# Patient Record
Sex: FEMALE | Race: WHITE | Employment: UNEMPLOYED | URBAN - METROPOLITAN AREA
[De-identification: names, ages, dates, MRNs, and addresses within clinical notes are randomized per-mention and may not be internally consistent; named-entity substitution may affect disease eponyms.]

---

## 2017-01-01 ENCOUNTER — HOSPITAL ENCOUNTER (OUTPATIENT)
Dept: PSYCHIATRY | Age: 37
Discharge: HOME | End: 2017-01-31
Attending: PSYCHIATRY & NEUROLOGY | Admitting: PSYCHIATRY & NEUROLOGY

## 2022-11-29 ENCOUNTER — APPOINTMENT (OUTPATIENT)
Dept: CT IMAGING | Age: 42
End: 2022-11-29

## 2022-11-29 ENCOUNTER — HOSPITAL ENCOUNTER (INPATIENT)
Age: 42
LOS: 2 days | Discharge: HOME | DRG: 149 | End: 2022-12-01
Attending: INTERNAL MEDICINE | Admitting: INTERNAL MEDICINE
Payer: MEDICAID

## 2022-11-29 ENCOUNTER — APPOINTMENT (OUTPATIENT)
Dept: GENERAL RADIOLOGY | Age: 42
End: 2022-11-29

## 2022-11-29 ENCOUNTER — HOSPITAL ENCOUNTER (EMERGENCY)
Age: 42
Discharge: ANOTHER HOSPITAL | End: 2022-11-29
Attending: STUDENT IN AN ORGANIZED HEALTH CARE EDUCATION/TRAINING PROGRAM

## 2022-11-29 VITALS
HEIGHT: 63 IN | RESPIRATION RATE: 18 BRPM | OXYGEN SATURATION: 95 % | TEMPERATURE: 98 F | SYSTOLIC BLOOD PRESSURE: 103 MMHG | HEART RATE: 61 BPM | DIASTOLIC BLOOD PRESSURE: 63 MMHG | WEIGHT: 137 LBS | BODY MASS INDEX: 24.27 KG/M2

## 2022-11-29 DIAGNOSIS — R29.90 STROKE-LIKE SYMPTOM: Primary | ICD-10-CM

## 2022-11-29 DIAGNOSIS — R42 DIZZINESS: ICD-10-CM

## 2022-11-29 DIAGNOSIS — R29.90 STROKE-LIKE SYMPTOMS: Primary | ICD-10-CM

## 2022-11-29 DIAGNOSIS — R53.1 RIGHT SIDED WEAKNESS: ICD-10-CM

## 2022-11-29 PROBLEM — Z72.0 TOBACCO ABUSE: Status: ACTIVE | Noted: 2022-11-29

## 2022-11-29 LAB
A/G RATIO: 2 (ref 1.1–2.2)
ALBUMIN SERPL-MCNC: 5.8 G/DL (ref 3.4–5)
ALP BLD-CCNC: 85 U/L (ref 40–129)
ALT SERPL-CCNC: 18 U/L (ref 10–40)
AMPHETAMINE SCREEN, URINE: POSITIVE
ANION GAP SERPL CALCULATED.3IONS-SCNC: 14 MMOL/L (ref 3–16)
AST SERPL-CCNC: 17 U/L (ref 15–37)
BARBITURATE SCREEN URINE: ABNORMAL
BASOPHILS ABSOLUTE: 0.1 K/UL (ref 0–0.2)
BASOPHILS RELATIVE PERCENT: 0.8 %
BENZODIAZEPINE SCREEN, URINE: ABNORMAL
BILIRUB SERPL-MCNC: 0.3 MG/DL (ref 0–1)
BILIRUBIN URINE: NEGATIVE
BLOOD, URINE: NEGATIVE
BUN BLDV-MCNC: 12 MG/DL (ref 7–20)
CALCIUM SERPL-MCNC: 11.5 MG/DL (ref 8.3–10.6)
CANNABINOID SCREEN URINE: POSITIVE
CHLORIDE BLD-SCNC: 101 MMOL/L (ref 99–110)
CLARITY: CLEAR
CO2: 24 MMOL/L (ref 21–32)
COCAINE METABOLITE SCREEN URINE: ABNORMAL
COLOR: YELLOW
CREAT SERPL-MCNC: 0.7 MG/DL (ref 0.6–1.1)
EKG ATRIAL RATE: 79 BPM
EKG DIAGNOSIS: NORMAL
EKG P AXIS: 55 DEGREES
EKG P-R INTERVAL: 126 MS
EKG Q-T INTERVAL: 380 MS
EKG QRS DURATION: 82 MS
EKG QTC CALCULATION (BAZETT): 435 MS
EKG R AXIS: 64 DEGREES
EKG T AXIS: 51 DEGREES
EKG VENTRICULAR RATE: 79 BPM
EOSINOPHILS ABSOLUTE: 0.2 K/UL (ref 0–0.6)
EOSINOPHILS RELATIVE PERCENT: 2 %
FENTANYL SCREEN, URINE: ABNORMAL
GFR SERPL CREATININE-BSD FRML MDRD: >60 ML/MIN/{1.73_M2}
GLUCOSE BLD-MCNC: 113 MG/DL (ref 70–99)
GLUCOSE BLD-MCNC: 115 MG/DL
GLUCOSE BLD-MCNC: 115 MG/DL (ref 70–99)
GLUCOSE URINE: NEGATIVE MG/DL
HCT VFR BLD CALC: 43.6 % (ref 36–48)
HEMOGLOBIN: 15 G/DL (ref 12–16)
INR BLD: 0.91 (ref 0.87–1.14)
KETONES, URINE: NEGATIVE MG/DL
LEUKOCYTE ESTERASE, URINE: NEGATIVE
LYMPHOCYTES ABSOLUTE: 2.4 K/UL (ref 1–5.1)
LYMPHOCYTES RELATIVE PERCENT: 22.2 %
Lab: ABNORMAL
MCH RBC QN AUTO: 32.5 PG (ref 26–34)
MCHC RBC AUTO-ENTMCNC: 34.4 G/DL (ref 31–36)
MCV RBC AUTO: 94.3 FL (ref 80–100)
METHADONE SCREEN, URINE: ABNORMAL
MICROSCOPIC EXAMINATION: NORMAL
MONOCYTES ABSOLUTE: 0.6 K/UL (ref 0–1.3)
MONOCYTES RELATIVE PERCENT: 5.6 %
NEUTROPHILS ABSOLUTE: 7.5 K/UL (ref 1.7–7.7)
NEUTROPHILS RELATIVE PERCENT: 69.4 %
NITRITE, URINE: NEGATIVE
OPIATE SCREEN URINE: ABNORMAL
OXYCODONE URINE: ABNORMAL
PDW BLD-RTO: 13.4 % (ref 12.4–15.4)
PERFORMED ON: ABNORMAL
PH UA: 5
PH UA: 6.5 (ref 5–8)
PHENCYCLIDINE SCREEN URINE: ABNORMAL
PLATELET # BLD: 479 K/UL (ref 135–450)
PMV BLD AUTO: 9.5 FL (ref 5–10.5)
POTASSIUM REFLEX MAGNESIUM: 4 MMOL/L (ref 3.5–5.1)
PROTEIN UA: NEGATIVE MG/DL
PROTHROMBIN TIME: 12.1 SEC (ref 11.7–14.5)
RBC # BLD: 4.62 M/UL (ref 4–5.2)
SODIUM BLD-SCNC: 139 MMOL/L (ref 136–145)
SPECIFIC GRAVITY UA: <=1.005 (ref 1–1.03)
TOTAL PROTEIN: 8.7 G/DL (ref 6.4–8.2)
TROPONIN: <0.01 NG/ML
URINE REFLEX TO CULTURE: NORMAL
URINE TYPE: NORMAL
UROBILINOGEN, URINE: 0.2 E.U./DL
WBC # BLD: 10.8 K/UL (ref 4–11)

## 2022-11-29 PROCEDURE — 99285 EMERGENCY DEPT VISIT HI MDM: CPT

## 2022-11-29 PROCEDURE — 6370000000 HC RX 637 (ALT 250 FOR IP): Performed by: NURSE PRACTITIONER

## 2022-11-29 PROCEDURE — 1200000000 HC SEMI PRIVATE

## 2022-11-29 PROCEDURE — 2580000003 HC RX 258: Performed by: INTERNAL MEDICINE

## 2022-11-29 PROCEDURE — 85025 COMPLETE CBC W/AUTO DIFF WBC: CPT

## 2022-11-29 PROCEDURE — 80053 COMPREHEN METABOLIC PANEL: CPT

## 2022-11-29 PROCEDURE — 80307 DRUG TEST PRSMV CHEM ANLYZR: CPT

## 2022-11-29 PROCEDURE — 36415 COLL VENOUS BLD VENIPUNCTURE: CPT

## 2022-11-29 PROCEDURE — 85610 PROTHROMBIN TIME: CPT

## 2022-11-29 PROCEDURE — 84484 ASSAY OF TROPONIN QUANT: CPT

## 2022-11-29 PROCEDURE — 93005 ELECTROCARDIOGRAM TRACING: CPT | Performed by: NURSE PRACTITIONER

## 2022-11-29 PROCEDURE — 6360000004 HC RX CONTRAST MEDICATION: Performed by: NURSE PRACTITIONER

## 2022-11-29 PROCEDURE — 81003 URINALYSIS AUTO W/O SCOPE: CPT

## 2022-11-29 PROCEDURE — 70450 CT HEAD/BRAIN W/O DYE: CPT

## 2022-11-29 PROCEDURE — 71045 X-RAY EXAM CHEST 1 VIEW: CPT

## 2022-11-29 PROCEDURE — 70498 CT ANGIOGRAPHY NECK: CPT

## 2022-11-29 PROCEDURE — 93010 ELECTROCARDIOGRAM REPORT: CPT | Performed by: INTERNAL MEDICINE

## 2022-11-29 RX ORDER — MECLIZINE HCL 25MG 25 MG/1
25 TABLET, CHEWABLE ORAL ONCE
Status: COMPLETED | OUTPATIENT
Start: 2022-11-29 | End: 2022-11-29

## 2022-11-29 RX ORDER — ACETAMINOPHEN 325 MG/1
650 TABLET ORAL EVERY 6 HOURS PRN
Status: DISCONTINUED | OUTPATIENT
Start: 2022-11-29 | End: 2022-12-01 | Stop reason: HOSPADM

## 2022-11-29 RX ORDER — SODIUM CHLORIDE 9 MG/ML
INJECTION, SOLUTION INTRAVENOUS PRN
Status: DISCONTINUED | OUTPATIENT
Start: 2022-11-29 | End: 2022-12-01 | Stop reason: HOSPADM

## 2022-11-29 RX ORDER — ONDANSETRON 2 MG/ML
4 INJECTION INTRAMUSCULAR; INTRAVENOUS EVERY 6 HOURS PRN
Status: DISCONTINUED | OUTPATIENT
Start: 2022-11-29 | End: 2022-12-01 | Stop reason: HOSPADM

## 2022-11-29 RX ORDER — ASPIRIN 81 MG/1
81 TABLET, CHEWABLE ORAL ONCE
Status: COMPLETED | OUTPATIENT
Start: 2022-11-29 | End: 2022-11-29

## 2022-11-29 RX ORDER — ASPIRIN 81 MG/1
162 TABLET, CHEWABLE ORAL ONCE
Status: COMPLETED | OUTPATIENT
Start: 2022-11-29 | End: 2022-11-29

## 2022-11-29 RX ORDER — ASPIRIN 81 MG/1
81 TABLET ORAL DAILY
Status: DISCONTINUED | OUTPATIENT
Start: 2022-11-30 | End: 2022-12-01 | Stop reason: HOSPADM

## 2022-11-29 RX ORDER — SODIUM CHLORIDE 0.9 % (FLUSH) 0.9 %
5-40 SYRINGE (ML) INJECTION EVERY 12 HOURS SCHEDULED
Status: DISCONTINUED | OUTPATIENT
Start: 2022-11-29 | End: 2022-12-01 | Stop reason: HOSPADM

## 2022-11-29 RX ORDER — IBUPROFEN 600 MG/1
600 TABLET ORAL ONCE
Status: COMPLETED | OUTPATIENT
Start: 2022-11-29 | End: 2022-11-29

## 2022-11-29 RX ORDER — POLYETHYLENE GLYCOL 3350 17 G/17G
17 POWDER, FOR SOLUTION ORAL DAILY PRN
Status: DISCONTINUED | OUTPATIENT
Start: 2022-11-29 | End: 2022-12-01 | Stop reason: HOSPADM

## 2022-11-29 RX ORDER — ENOXAPARIN SODIUM 100 MG/ML
40 INJECTION SUBCUTANEOUS DAILY
Status: DISCONTINUED | OUTPATIENT
Start: 2022-11-30 | End: 2022-12-01 | Stop reason: HOSPADM

## 2022-11-29 RX ORDER — SODIUM CHLORIDE 0.9 % (FLUSH) 0.9 %
5-40 SYRINGE (ML) INJECTION PRN
Status: DISCONTINUED | OUTPATIENT
Start: 2022-11-29 | End: 2022-12-01 | Stop reason: HOSPADM

## 2022-11-29 RX ORDER — ONDANSETRON 4 MG/1
4 TABLET, ORALLY DISINTEGRATING ORAL EVERY 8 HOURS PRN
Status: DISCONTINUED | OUTPATIENT
Start: 2022-11-29 | End: 2022-12-01 | Stop reason: HOSPADM

## 2022-11-29 RX ORDER — ACETAMINOPHEN 650 MG/1
650 SUPPOSITORY RECTAL EVERY 6 HOURS PRN
Status: DISCONTINUED | OUTPATIENT
Start: 2022-11-29 | End: 2022-12-01 | Stop reason: HOSPADM

## 2022-11-29 RX ADMIN — SODIUM CHLORIDE, PRESERVATIVE FREE 10 ML: 5 INJECTION INTRAVENOUS at 22:28

## 2022-11-29 RX ADMIN — MECLIZINE HYDROCHLORIDE 25 MG: 25 TABLET, CHEWABLE ORAL at 17:14

## 2022-11-29 RX ADMIN — ASPIRIN 81 MG: 81 TABLET, CHEWABLE ORAL at 11:15

## 2022-11-29 RX ADMIN — IBUPROFEN 600 MG: 600 TABLET, FILM COATED ORAL at 17:14

## 2022-11-29 RX ADMIN — IOPAMIDOL 85 ML: 755 INJECTION, SOLUTION INTRAVENOUS at 10:22

## 2022-11-29 RX ADMIN — ASPIRIN 162 MG: 81 TABLET, CHEWABLE ORAL at 12:57

## 2022-11-29 ASSESSMENT — ENCOUNTER SYMPTOMS
VOMITING: 0
ABDOMINAL PAIN: 0
RHINORRHEA: 0
SORE THROAT: 0
BLOOD IN STOOL: 0
NAUSEA: 0
COUGH: 0
BACK PAIN: 0
SHORTNESS OF BREATH: 0
DIARRHEA: 0
EYE PAIN: 0

## 2022-11-29 ASSESSMENT — PAIN DESCRIPTION - DESCRIPTORS
DESCRIPTORS: DULL;DISCOMFORT
DESCRIPTORS: DULL

## 2022-11-29 ASSESSMENT — PAIN DESCRIPTION - FREQUENCY
FREQUENCY: CONTINUOUS
FREQUENCY: CONTINUOUS

## 2022-11-29 ASSESSMENT — PAIN SCALES - GENERAL
PAINLEVEL_OUTOF10: 8
PAINLEVEL_OUTOF10: 8
PAINLEVEL_OUTOF10: 0
PAINLEVEL_OUTOF10: 0

## 2022-11-29 ASSESSMENT — PAIN DESCRIPTION - PAIN TYPE
TYPE: ACUTE PAIN
TYPE: ACUTE PAIN

## 2022-11-29 ASSESSMENT — LIFESTYLE VARIABLES: HOW OFTEN DO YOU HAVE A DRINK CONTAINING ALCOHOL: NEVER

## 2022-11-29 ASSESSMENT — PAIN DESCRIPTION - LOCATION
LOCATION: RIB CAGE
LOCATION: RIB CAGE

## 2022-11-29 ASSESSMENT — PAIN DESCRIPTION - ORIENTATION
ORIENTATION: RIGHT
ORIENTATION: RIGHT

## 2022-11-29 NOTE — ED NOTES
[address] called back at 1738 with:  Bed Number: [address] DaquanJayant  Report Number: [phone]  Accepting Doctor: Mooyd Aquino   Transport: [employer]   ETA: 2030       Tobi [facility]  11/29/22 0767

## 2022-11-29 NOTE — ED NOTES
1013-Code Stroke called  1013-CT called   1013- Stroke team called  1019-Call back received from Dr. Christa Crabtree Stroke team spoke with Alli Frey NP.      Lloyd Sandhu  11/29/22 1021

## 2022-11-29 NOTE — ED PROVIDER NOTES
I independently performed a history and physical on [facility]. All diagnostic, treatment, and disposition decisions were made by myself in conjunction with the advanced practice provider/resident. Labs Reviewed   CBC WITH AUTO DIFFERENTIAL - Abnormal; Notable for the following components:       Result Value    Platelets 664 (*)     All other components within normal limits   COMPREHENSIVE METABOLIC PANEL W/ REFLEX TO MG FOR LOW K - Abnormal; Notable for the following components:    Glucose 113 (*)     Calcium 11.5 (*)     Total Protein 8.7 (*)     Albumin 5.8 (*)     All other components within normal limits   POCT GLUCOSE - Abnormal; Notable for the following components:    POC Glucose 115 (*)     All other components within normal limits   POCT GLUCOSE - Normal   TROPONIN   PROTIME-INR   URINALYSIS WITH REFLEX TO CULTURE     XR CHEST PORTABLE   Final Result   No acute disease      Remote right clavicle fracture is seen, unchanged compared to 2016         CTA HEAD NECK W CONTRAST   Final Result   No apparent arterial high grade stenosis, occlusion or aneurysm within the   head or neck. RECOMMENDATIONS:   Unavailable         CT HEAD WO CONTRAST   Final Result   No acute intracranial abnormality. Trace paranasal sinus disease      Results discussed with DELIA SEPULVEDA by Jennifer Michelle. Nia Hall MD at 10:36 am on   11/29/2022           For further details of [address] [place] [address] emergency department encounter, please see the AMRIT/resident's documentation. I personally saw the patient and performed a substantive portion of the visit including all aspects of the medical decision making. Briefly, this is a 61-year-old female, she is presenting with concerns for dizziness that she describes as a room spinning sensation over the past few days, with right upper and right lower extremity weakness/tingling that started at 7 AM this morning. She did not wake up with her symptoms.   NIH of 3, was put out as a stroke alert given the onset of her right-sided symptoms. Per stroke team, no indication for acute intervention. CT and CTA are negative for any acute concerning findings. She was treated with aspirin, will be transferred for further neurological work-up and treatment. She is comfortable in agreement with plan of care. I personally saw the patient and independently provided 15 minutes of non-concurrent critical care out of the total shared critical care time provided. I, Dr. Jnaet Zuniga, am the primary clinician of record. 1. Stroke-like symptoms    2. Right sided weakness    3. Dizziness      Comment: Please note this report has been produced using speech recognition software and may contain errors related to that system including errors in grammar, punctuation, and spelling, as well as words and phrases that may be inappropriate. If there are any questions or concerns please feel free to contact the dictating provider for clarification.        El Shelby MD  11/29/22 1162

## 2022-11-29 NOTE — ED NOTES
1059-Call placed to [facility] for Transfer to Nasreen Osborne for the Hospitalist placed by Sharif Starr Pap  11/29/22 1100
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
## 2022-11-30 ENCOUNTER — APPOINTMENT (OUTPATIENT)
Dept: MRI IMAGING | Age: 42
DRG: 149 | End: 2022-11-30
Attending: INTERNAL MEDICINE
Payer: MEDICAID

## 2022-11-30 PROBLEM — R42 DIZZINESS: Status: ACTIVE | Noted: 2022-11-30

## 2022-11-30 PROBLEM — H81.393 PERIPHERAL VERTIGO, BILATERAL: Status: ACTIVE | Noted: 2022-11-30

## 2022-11-30 LAB
ANION GAP SERPL CALCULATED.3IONS-SCNC: 9 MMOL/L (ref 3–16)
BUN BLDV-MCNC: 11 MG/DL (ref 7–20)
CALCIUM SERPL-MCNC: 9.9 MG/DL (ref 8.3–10.6)
CHLORIDE BLD-SCNC: 106 MMOL/L (ref 99–110)
CHOLESTEROL, TOTAL: 212 MG/DL (ref 0–199)
CO2: 28 MMOL/L (ref 21–32)
CREAT SERPL-MCNC: 0.6 MG/DL (ref 0.6–1.1)
ESTIMATED AVERAGE GLUCOSE: 111.2 MG/DL
GFR SERPL CREATININE-BSD FRML MDRD: >60 ML/MIN/{1.73_M2}
GLUCOSE BLD-MCNC: 102 MG/DL (ref 70–99)
HBA1C MFR BLD: 5.5 %
HDLC SERPL-MCNC: 58 MG/DL (ref 40–60)
LDL CHOLESTEROL CALCULATED: 136 MG/DL
POTASSIUM REFLEX MAGNESIUM: 4.3 MMOL/L (ref 3.5–5.1)
SODIUM BLD-SCNC: 143 MMOL/L (ref 136–145)
TRIGL SERPL-MCNC: 90 MG/DL (ref 0–150)
TROPONIN: <0.01 NG/ML
TROPONIN: <0.01 NG/ML
VLDLC SERPL CALC-MCNC: 18 MG/DL

## 2022-11-30 PROCEDURE — 84484 ASSAY OF TROPONIN QUANT: CPT

## 2022-11-30 PROCEDURE — 80048 BASIC METABOLIC PNL TOTAL CA: CPT

## 2022-11-30 PROCEDURE — 6360000002 HC RX W HCPCS: Performed by: INTERNAL MEDICINE

## 2022-11-30 PROCEDURE — 80061 LIPID PANEL: CPT

## 2022-11-30 PROCEDURE — 6370000000 HC RX 637 (ALT 250 FOR IP): Performed by: INTERNAL MEDICINE

## 2022-11-30 PROCEDURE — 97116 GAIT TRAINING THERAPY: CPT

## 2022-11-30 PROCEDURE — 36415 COLL VENOUS BLD VENIPUNCTURE: CPT

## 2022-11-30 PROCEDURE — 99253 IP/OBS CNSLTJ NEW/EST LOW 45: CPT | Performed by: PSYCHIATRY & NEUROLOGY

## 2022-11-30 PROCEDURE — 97166 OT EVAL MOD COMPLEX 45 MIN: CPT

## 2022-11-30 PROCEDURE — 2580000003 HC RX 258: Performed by: INTERNAL MEDICINE

## 2022-11-30 PROCEDURE — 99254 IP/OBS CNSLTJ NEW/EST MOD 60: CPT | Performed by: INTERNAL MEDICINE

## 2022-11-30 PROCEDURE — 97162 PT EVAL MOD COMPLEX 30 MIN: CPT

## 2022-11-30 PROCEDURE — 97530 THERAPEUTIC ACTIVITIES: CPT

## 2022-11-30 PROCEDURE — 97535 SELF CARE MNGMENT TRAINING: CPT

## 2022-11-30 PROCEDURE — 97110 THERAPEUTIC EXERCISES: CPT

## 2022-11-30 PROCEDURE — 1200000000 HC SEMI PRIVATE

## 2022-11-30 PROCEDURE — 70551 MRI BRAIN STEM W/O DYE: CPT

## 2022-11-30 PROCEDURE — 83036 HEMOGLOBIN GLYCOSYLATED A1C: CPT

## 2022-11-30 RX ORDER — SODIUM CHLORIDE 9 MG/ML
INJECTION, SOLUTION INTRAVENOUS CONTINUOUS
Status: ACTIVE | OUTPATIENT
Start: 2022-11-30 | End: 2022-11-30

## 2022-11-30 RX ORDER — ATORVASTATIN CALCIUM 40 MG/1
40 TABLET, FILM COATED ORAL NIGHTLY
Status: DISCONTINUED | OUTPATIENT
Start: 2022-11-30 | End: 2022-12-01 | Stop reason: HOSPADM

## 2022-11-30 RX ADMIN — ASPIRIN 81 MG: 81 TABLET, COATED ORAL at 08:53

## 2022-11-30 RX ADMIN — ENOXAPARIN SODIUM 40 MG: 100 INJECTION SUBCUTANEOUS at 08:52

## 2022-11-30 RX ADMIN — ATORVASTATIN CALCIUM 40 MG: 40 TABLET, FILM COATED ORAL at 21:02

## 2022-11-30 RX ADMIN — SODIUM CHLORIDE, PRESERVATIVE FREE 10 ML: 5 INJECTION INTRAVENOUS at 08:54

## 2022-11-30 RX ADMIN — ACETAMINOPHEN 650 MG: 325 TABLET ORAL at 16:14

## 2022-11-30 RX ADMIN — SODIUM CHLORIDE: 9 INJECTION, SOLUTION INTRAVENOUS at 03:55

## 2022-11-30 RX ADMIN — SODIUM CHLORIDE, PRESERVATIVE FREE 10 ML: 5 INJECTION INTRAVENOUS at 21:02

## 2022-11-30 ASSESSMENT — PAIN SCALES - GENERAL
PAINLEVEL_OUTOF10: 8

## 2022-11-30 ASSESSMENT — PAIN DESCRIPTION - FREQUENCY
FREQUENCY: CONTINUOUS

## 2022-11-30 ASSESSMENT — PAIN DESCRIPTION - LOCATION
LOCATION: ABDOMEN
LOCATION: ABDOMEN
LOCATION: RIB CAGE

## 2022-11-30 ASSESSMENT — PAIN DESCRIPTION - DESCRIPTORS
DESCRIPTORS: DULL;DISCOMFORT
DESCRIPTORS: DISCOMFORT;DULL
DESCRIPTORS: DULL;DISCOMFORT

## 2022-11-30 ASSESSMENT — PAIN DESCRIPTION - ORIENTATION
ORIENTATION: RIGHT;UPPER
ORIENTATION: RIGHT;UPPER
ORIENTATION: RIGHT

## 2022-11-30 ASSESSMENT — PAIN - FUNCTIONAL ASSESSMENT
PAIN_FUNCTIONAL_ASSESSMENT: ACTIVITIES ARE NOT PREVENTED
PAIN_FUNCTIONAL_ASSESSMENT: ACTIVITIES ARE NOT PREVENTED

## 2022-11-30 ASSESSMENT — PAIN DESCRIPTION - PAIN TYPE
TYPE: ACUTE PAIN

## 2022-11-30 ASSESSMENT — PAIN DESCRIPTION - ONSET
ONSET: ON-GOING

## 2022-11-30 NOTE — CONSULTS
Consult placed    [phone] [facility] Card[facility]  Date:11/30/2022,  Time:10:13 AM        Electronically signed by Robin Morales on 11/30/2022 at 10:13 AM
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
## 2022-11-30 NOTE — PLAN OF CARE
Problem: Discharge Planning  Goal: Discharge to home or other facility with appropriate resources  11/30/2022 1130 by Robert Zapien RN  Outcome: Progressing  11/30/2022 1127 by Robert Zapien RN  Outcome: Progressing  Flowsheets (Taken 11/30/2022 1310)  Discharge to home or other facility with appropriate resources: Identify barriers to discharge with patient and caregiver  11/30/2022 0226 by Lolis Chase RN  Outcome: Progressing  Flowsheets (Taken 11/30/2022 0226)  Discharge to home or other facility with appropriate resources: Identify barriers to discharge with patient and caregiver     Problem: Pain  Goal: Verbalizes/displays adequate comfort level or baseline comfort level  11/30/2022 1130 by Robert Zapien RN  Outcome: Progressing  11/30/2022 1127 by Robert Zapien RN  Outcome: Progressing  Flowsheets (Taken 11/30/2022 0736)  Verbalizes/displays adequate comfort level or baseline comfort level: Encourage patient to monitor pain and request assistance  11/30/2022 0226 by Lolis Chase RN  Outcome: Progressing  Flowsheets (Taken 11/30/2022 0226)  Verbalizes/displays adequate comfort level or baseline comfort level:   Encourage patient to monitor pain and request assistance   Assess pain using appropriate pain scale   Administer analgesics based on type and severity of pain and evaluate response   Implement non-pharmacological measures as appropriate and evaluate response  Note: Patient satisfied with current pain level     Problem: Neurosensory - Adult  Goal: Achieves stable or improved neurological status  11/30/2022 1130 by Robert Zapien RN  Outcome: Progressing  11/30/2022 1127 by Robert Zapien RN  Outcome: Progressing  Flowsheets (Taken 11/30/2022 5066)  Achieves stable or improved neurological status: Assess for and report changes in neurological status  11/30/2022 0226 by Lolis Chase RN  Outcome: Progressing  Flowsheets (Taken 11/30/2022 0226)  Achieves stable or improved neurological status: Assess for and report changes in neurological status  Goal: Achieves maximal functionality and self care  11/30/2022 1130 by Enoch Rothman RN  Outcome: Progressing  11/30/2022 1127 by Enoch Rothman RN  Outcome: Progressing  Flowsheets (Taken 11/30/2022 1756)  Achieves maximal functionality and self care: Monitor swallowing and airway patency with patient fatigue and changes in neurological status  11/30/2022 0226 by Suleman Wilson RN  Outcome: Progressing     Problem: Skin/Tissue Integrity - Adult  Goal: Skin integrity remains intact  11/30/2022 1130 by Enoch Rothman RN  Outcome: Progressing  11/30/2022 1127 by Enoch Rothman RN  Outcome: Progressing  Flowsheets  Taken 11/30/2022 1127  Skin Integrity Remains Intact: Monitor for areas of redness and/or skin breakdown  Taken 11/30/2022 0842  Skin Integrity Remains Intact: Monitor for areas of redness and/or skin breakdown  11/30/2022 0226 by Suleman Wilson RN  Outcome: Progressing  Flowsheets (Taken 11/30/2022 0226)  Skin Integrity Remains Intact: Monitor for areas of redness and/or skin breakdown     Problem: Musculoskeletal - Adult  Goal: Return mobility to safest level of function  11/30/2022 1130 by Enoch Rothman RN  Outcome: Progressing  11/30/2022 1127 by Enoch Rothman RN  Outcome: Progressing  Flowsheets (Taken 11/30/2022 2582)  Return Mobility to Safest Level of Function: Assess patient stability and activity tolerance for standing, transferring and ambulating with or without assistive devices  11/30/2022 0226 by Suleman Wilson RN  Outcome: Progressing  Flowsheets (Taken 11/30/2022 0226)  Return Mobility to Safest Level of Function: Assess patient stability and activity tolerance for standing, transferring and ambulating with or without assistive devices  Note: Fall precautions in place  Goal: Maintain proper alignment of affected body part  11/30/2022 1130 by Enoch Rothman RN  Outcome: Progressing  11/30/2022 1127 by Enoch Rothman RN  Outcome: Progressing  Flowsheets (Taken 11/30/2022 6452)  Maintain proper alignment of affected body part: Support and protect limb and body alignment per provider's orders  11/30/2022 0226 by Georgie Mejía RN  Outcome: Progressing  Goal: Return ADL status to a safe level of function  11/30/2022 1130 by Adela Gilliam RN  Outcome: Progressing  11/30/2022 1127 by Adela Gilliam RN  Outcome: Progressing  Flowsheets (Taken 11/30/2022 7146)  Return ADL Status to a Safe Level of Function: Administer medication as ordered  11/30/2022 0226 by Georgie Mejía RN  Outcome: Progressing     Problem: Gastrointestinal - Adult  Goal: Minimal or absence of nausea and vomiting  11/30/2022 1130 by Adela Gilliam RN  Outcome: Progressing  11/30/2022 1127 by Adela Gilliam RN  Outcome: Progressing  Flowsheets (Taken 11/30/2022 6694)  Minimal or absence of nausea and vomiting: Administer IV fluids as ordered to ensure adequate hydration  11/30/2022 0226 by Georgie Mejía RN  Outcome: Not Progressing  Note: Pt reported nausea and reduced appetite  Goal: Maintains or returns to baseline bowel function  11/30/2022 1130 by Adela Gilliam RN  Outcome: Progressing  11/30/2022 1127 by Adela Gilliam RN  Outcome: Progressing  Flowsheets (Taken 11/30/2022 5044)  Maintains or returns to baseline bowel function: Assess bowel function  11/30/2022 0226 by Georgie Mejía RN  Outcome: Progressing  Goal: Maintains adequate nutritional intake  11/30/2022 1130 by Adela Gilliam RN  Outcome: Progressing  11/30/2022 1127 by Adela Gilliam RN  Outcome: Progressing  Flowsheets (Taken 11/30/2022 6090)  Maintains adequate nutritional intake: Monitor percentage of each meal consumed  11/30/2022 0226 by Georgie Mejía RN  Outcome: Progressing     Problem: Gastrointestinal - Adult  Goal: Minimal or absence of nausea and vomiting  11/30/2022 1130 by Adela Gilliam RN  Outcome: Progressing  11/30/2022 1127 by Adela Gilliam RN  Outcome: Progressing  Flowsheets (Taken 11/30/2022 8311)  Minimal or absence of nausea and vomiting: Administer IV fluids as ordered to ensure adequate hydration  11/30/2022 0226 by Lolis Chase, RN  Outcome: Not Progressing  Note: Pt reported nausea and reduced appetite
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
## 2022-11-30 NOTE — PROGRESS NOTES
Occupational Therapy  Facility/Department: [place] [address] - MED SURG  Occupational Therapy Initial Assessment and Treatment Note    Name: Yair Billings  : 1980  MRN: [id number]  Date of Service: 2022    Discharge Recommendations:  24 hour supervision or assist      Patient Diagnosis(es): There were no encounter diagnoses. Past Medical History:  has a past medical history of Depression and Psychiatric problem. Past Surgical History:  has a past surgical history that includes Tonsillectomy and chest tube insertion (Right). Assessment   Performance deficits / Impairments: Decreased functional mobility ; Decreased ADL status; Decreased strength;Decreased balance  Assessment: OT eval and tx completed. Pt admitted for stroke-like symptoms with c/o RUE/RLE heaviness. Pt is independent at baseline. During OT session, pt was SBA/CGA for standing balance and transfers. Appeared to be unstable at R knee but did not lose balance. Balance improved with RW to SBA. Pt instructed on RUE exercises for strengthening. OT will cont to address RUE function, mobility and ADLs. Cont OT in acute care. Prognosis: Good  Decision Making: Medium Complexity  REQUIRES OT FOLLOW-UP: Yes  Activity Tolerance  Activity Tolerance: Patient Tolerated treatment well        Plan   Occupational Therapy Plan  Times Per Week: 3-5x  Current Treatment Recommendations: Functional mobility training, Strengthening, Self-Care / ADL, Equipment evaluation, education, & procurement, Safety education & training     Restrictions  Restrictions/Precautions  Restrictions/Precautions: Fall Risk, Up as Tolerated  Position Activity Restriction  Other position/activity restrictions: IV    Subjective   General  Chart Reviewed: Yes  Patient assessed for rehabilitation services?: Yes  Family / Caregiver Present: No  Referring Practitioner: ANGEL Claros  Diagnosis: stroke like symptoms  Subjective  Subjective: Pt agreeable to OT.  Denies pain but c/o's R side heaviness. General Comment  Comments: RN approved therapy   Social/Functional History  Social/Functional History  Lives With: Significant other  Type of Home: House  Home Layout: One level  Home Access: Stairs to enter without rails  Entrance Stairs - Number of Steps: 2 NEYMAR  Bathroom Shower/Tub: Tub/Shower unit  Bathroom Toilet: Standard  Home Equipment:  (No DME)  Has the patient had two or more falls in the past year or any fall with injury in the past year?: No  ADL Assistance: [address]: Independent  Homemaking Responsibilities: Yes  Ambulation Assistance: Independent  Transfer Assistance: Independent  Active : No  Patient's  Info: Friends and family  Occupation: Unemployed  [address]: Clean, dance, go for walks. Objective   Heart Rate: 78  Heart Rate Source: Monitor  BP: (!) 151/84  BP Location: Left Arm  BP Method: Automatic  Patient Position: Semi fowlers  MAP (Calculated): 106  Resp: 16  SpO2: 95 %  O2 Device: None (Room air)             Safety Devices  Type of Devices: Call light within reach; Chair alarm in place;Gait belt;Left in chair;Nurse notified     Toilet Transfers  Toilet - Technique: Ambulating  Equipment Used: Grab bars  Toilet Transfer: Stand by assistance  AROM: Within functional limits (BUE is [employer])  Strength: Generally decreased, functional (LUE is WNL. R shoulder to elbow 4/5, R forearm 4+/5)  Coordination: Within functional limits (Pt able to use RUE for ADL tasks without difficulty.  Able to grasp and release and handle items.)  Tone: Normal  Sensation: Intact  ADL  Feeding: Independent  Grooming: Stand by assistance  Grooming Skilled Clinical Factors: Washed hands at sink  LE Dressing: Stand by assistance  LE Dressing Skilled Clinical Factors: donned socks while seated EOB  Toileting: Stand by assistance        Bed mobility  Supine to Sit: Stand by assistance (HOB elevated)  Transfers  Stand Pivot Transfers: Contact guard assistance;Stand by assistance (CGA without device, noted RLE instability at knee when she took steps. When provided with RW, she was SBA to walk in hallway)  Sit to stand: Stand by assistance  Stand to sit: Stand by assistance  Transfer Comments: SBA/CGA to ambulate to/from BR without device. Vision  Vision: Impaired  Vision Exceptions: Wears glasses at all times  Hearing  Hearing: Within functional limits  Cognition  Overall Cognitive Status: WFL  Orientation  Overall Orientation Status: Within Normal Limits  Orientation Level: Oriented X4           A/AROM Exercises: Education provided on RUE exercises for shoulder, elbow, forearm, wrist and hand --additional education would be beneficial.  Education Given To: Patient  Education Provided: Role of Therapy;Home Exercise Program;Plan of Care;ADL Adaptive Strategies; Equipment;Transfer Training;IADL Safety  Education Method: Verbal  Education Outcome: Verbalized understanding;Continued education needed   Disease Specific Education: Pt educated on signs and symptoms of CVA and need to seek immediate medical attention should they occur.  Pt verbalized understanding    AM-PAC Score      AM-[facility] Inpatient Daily Activity Raw Score: 19 (11/30/22 1420)  AM-PAC Inpatient ADL T-Scale Score : 40.22 (11/30/22 1420)  ADL Inpatient CMS 0-100% Score: 42.8 (11/30/22 1420)  ADL Inpatient CMS G-Code Modifier : CK (11/30/22 1420)  Short Term Goals  Time Frame for Short Term Goals: 1 week (12/7) unless noted  Short Term Goal 1: Perform functional transfers with supervision and AD as needed  Short Term Goal 2: Perform RUE exer program independently after instruction by 12/02  Short Term Goal 3: Perform 2-3 UE ADL tasks with supervision  Patient Goals   Patient goals : Be able to walk     Therapy Time   Individual Concurrent Group Co-treatment   Time In 1025         Time Out 1058         Minutes 33         Timed Code Treatment Minutes: 23 Minutes (10 min eval)   If pt is discharged prior to next OT session,

## 2022-11-30 NOTE — CARE COORDINATION
CASE MANAGEMENT INITIAL ASSESSMENT      Reviewed chart and completed assessment with patient:bedside  Family present: yes  Explained Case Management role/services. Primary contact information:Mary/family member    Health Care Decision Maker :   Primary Decision Maker: [facility] - Emergency Contact - [phone]    Secondary Decision Maker: Kandi Crowley - Other - [phone]          Can this person be reached and be able to respond quickly, such as within a few minutes or hours? Yes    Admit date/status:11/29/22 INPT  Diagnosis:stroke like symptoms   Is this a Readmission?:  No      Insurance:NONE   Precert required for SNF: No       3 night stay required: No    Living arrangements, Adls, care needs, prior to admission: pt lives in a 1 story house with SO/Bill. 2 NEYMAR. IPTA    Durable Medical Equipment at home:  none    Services in the home and/or outpatient, prior to admission:none    Current PCP:NONE                                Medications: Prescription coverage? No Will pt require financial assistance with medications Yes     Transportation needs: none/private. Family/friends provide transportation       PT/OT recs:none    Hospital Exemption Notification (HEN):needed for SNF    Barriers to discharge:no PCP, no Payor    Plan/comments: Stroke-like symptoms. Management per neuro and IM. Plan for MRI and ECHO. Pt from home w/SO. IPTA. PCP list and [facility] information given. Consult placed to financial counselor for assistance with Medicaid application. Pt denies other needs at this time. CM will follow.  Vy Ambrose RN     ECOC on chart for MD signature

## 2022-11-30 NOTE — PLAN OF CARE
TODAYS DATE:  11/30/2022    Discussed personal risk factors for Stroke /TIA with patient/family, and ways to reduce the risk for a recurrent stroke. Patient's personal risk factors which were identified are:     [] Alcohol Abuse: check with your physician before any alcohol consumption. [] Atrial fibrillation: may cause blood clots. [] Drug Abuse: Seek help, talk with your doctor  [] Clotting Disorder  [] Diabetes  [] Family history of stroke or heart disease  [] High Blood Pressure/Hypertension: work with your physician.  [] High cholesterol: monitor cholesterol levels with your physician.   [] Overweight/Obesity: work with your physician for your ideal body weight.  [] Physical Inactivity: get regular exercise as directed by your physician. [] Personal history of previous TIA or stroke  [] Poor Diet; decrease salt (sodium) in your diet, follow diet directed by physician. [x] Smoking: Cigarette/Cigar: stop smoking. Reviewed the Following Education with Patient and/or Family:   -Signs and Symptoms of Stroke:     (facial droop, weakness/numbness especially on one side, speech difficulty, sudden confusion, sudden loss of vision, sudden severe headache,       sudden loss of balance or having difficulty walking, syncope or seizure)  -How to activate EMS (911)   -Importance of Follow Up Appointment at Discharge   -Importance of Compliance with Medications Prescribed at Discharge     Pt verbalized understanding.      Family Present during Education: no     Stroke Education Booklet given to patient/family which includes above education: yes     Electronically signed by Carlito De La Cruz RN on 11/30/2022 at 6:40 AM

## 2022-11-30 NOTE — PROGRESS NOTES
Hospitalist Progress Note      PCP: Ines Valentino APRN - CNP    Date of Admission: 11/29/2022    Chief Complaint: Chest pain, right-sided weakness    Hospital Course: Transferred to [facility] for neurology evaluation after presenting to ER with right-sided weakness. Also complains of some left anterior chest pain. Smoker. Subjective: Currently does not have chest pain or shortness of breath. Right-sided weakness has improved      Medications:  Reviewed    Infusion Medications    sodium chloride       Scheduled Medications    atorvastatin  40 mg Oral Nightly    sodium chloride flush  5-40 mL IntraVENous 2 times per day    enoxaparin  40 mg SubCUTAneous Daily    aspirin  81 mg Oral Daily     PRN Meds: sodium chloride flush, sodium chloride, ondansetron **OR** ondansetron, polyethylene glycol, acetaminophen **OR** acetaminophen      Intake/Output Summary (Last 24 hours) at 11/30/2022 1247  Last data filed at 11/30/2022 1154  Gross per 24 hour   Intake 350 ml   Output 500 ml   Net -150 ml       Physical Exam Performed:    BP (!) 151/84   Pulse 78   Temp 97.4 °F (36.3 °C) (Oral)   Resp 16   Ht 5' 3\" (1.6 m)   Wt 139 lb 14.4 oz (63.5 kg)   SpO2 95%   BMI 24.78 kg/m²     General appearance: No apparent distress, appears stated age and cooperative. HEENT: Pupils equal, round, and reactive to light. Conjunctivae/corneas clear. Neck: Supple, with full range of motion. No jugular venous distention. Trachea midline. Respiratory:  Normal respiratory effort. Clear to auscultation, bilaterally without Rales/Wheezes/Rhonchi. Cardiovascular: Regular rate and rhythm with normal S1/S2 without murmurs, rubs or gallops. Abdomen: Soft, non-tender, non-distended with normal bowel sounds. Musculoskeletal: No clubbing, cyanosis or edema bilaterally. Full range of motion without deformity. Skin: Skin color, texture, turgor normal.  No rashes or lesions.   Neurologic:  Neurovascularly intact without any focal sensory/motor deficits. Cranial nerves: II-XII intact, grossly non-focal.  Psychiatric: Alert and oriented, thought content appropriate, normal insight  Capillary Refill: Brisk, 3 seconds, normal   Peripheral Pulses: +2 palpable, equal bilaterally       Labs:   Recent Labs     11/29/22  1004   WBC 10.8   HGB 15.0   HCT 43.6   *     Recent Labs     11/29/22  1004 11/30/22  0734    143   K 4.0 4.3    106   CO2 24 28   BUN 12 11   CREATININE 0.7 0.6   CALCIUM 11.5* 9.9     Recent Labs     11/29/22  1004   AST 17   ALT 18   BILITOT 0.3   ALKPHOS 85     Recent Labs     11/29/22  1004   INR 0.91     Recent Labs     11/29/22  1004 11/30/22  1030   TROPONINI <0.01 <0.01       Urinalysis:      Lab Results   Component Value Date/Time    NITRU Negative 11/29/2022 12:28 PM    BLOODU Negative 11/29/2022 12:28 PM    SPECGRAV <=1.005 11/29/2022 12:28 PM    GLUCOSEU Negative 11/29/2022 12:28 PM       Radiology:  MRI BRAIN WO CONTRAST    (Results Pending)       IP CONSULT TO NEUROLOGY  IP CONSULT TO FINANCIAL COUNSELOR  IP CONSULT TO CARDIOLOGY    Assessment/Plan:    Active Hospital Problems    Diagnosis     Stroke-like symptom [R29.90]      Priority: Medium    Tobacco abuse [Z72.0]      Priority: Medium     PLAN:    Right-sided upper and lower extremity weakness  This has since resolved. Patient was transferred to our hospital for neurology evaluation. MRI of the brain ordered and is currently pending. Echocardiogram not yet done. Patient is a smoker. Given his MRI of the brain does not show the CVA, I believe patient will require secondary prevention for TIA/CVA. Chest pain  Right-sided anterior chest heaviness radiating to left side of the anterior chest.  Concerning for coronary artery disease mainly because of personal risk factors. Cardiology consulted. Echocardiogram ordered. Cardiac stress test may not be needed pending echo results.     Tobacco abuse  Risk factor both for TIA/CVA and acute coronary syndrome. Patient states she decided to quit and will not smoke again. Discussed with patient and questions answered. DVT Prophylaxis: Lovenox  Diet: ADULT DIET; Regular  Code Status: Full Code  PT/OT Eval Status: Ordered    Dispo -unclear day of discharge pending test results.     Appropriate for A1 Discharge Unit: Marguerite Boo MD

## 2022-11-30 NOTE — PROGRESS NOTES
Patient admitted to room 351 from [facility] ED. Patient oriented to room, call light, bed rails, phone, lights and bathroom. Patient instructed about the schedule of the day including: vital sign frequency, lab draws, possible tests, frequency of MD and staff rounds, including RN/MD rounding together at bedside, daily weights, and I &O's. Patient instructed about prescribed diet, how to use 8MENU, and television. Bed alarm in place, patient aware of placement and reason. Telemetry box in place, patient aware of placement and reason. Bed locked, in lowest position, side rails up 2/4, call light within reach. Patient complaining of nausea, dizziness, and pain in the RUQ. Unsteadiness of right leg when ambulating to the bathroom.

## 2022-11-30 NOTE — CONSULTS
CARDIOLOGY CONSULTATION        Patient Name: Vincent Hebert  Date of admission: 11/29/2022  9:00 PM  Admission Dx: Stroke-like symptom [R29.90]  Requesting Physician: Karen Garza MD  Primary Care physician: DIONISIO Rankin - CNP    Reason for Consultation/Chief Complaint: Chest pain     History of Present Illness:     Vincent Hebert is a 38 y.o. patient with a prior medical history notable for depression, prior substance abuse, tobacco abuse, who presented to the hospital with complaints of strokelike symptoms. Cardiology is consulted for chest pain and elevated troponin    ED course/Vital signs on presentation: Blood pressure initially elevated 181/107, afebrile, heart rate 97, 100 percent sats on room air. EKG showed sinus rhythm, possible left atrial enlargement and nonspecific ST abnormality. Echocardiogram pending. Troponins negative. UDS positive for amphetamines and cannabis. NCHCT neg. CT Head and neck w/ shows no intracranial stenosis or aneurysm. Patient present initially with dizziness, nausea and right sided upper extremity and lower extremity heaviness/weakness. No dysarthria or facial droop. No paresthesias. Mild visual disturbance. Notes she's been dizzy since Friday. Today patient states she's had onset right upper chest pain since Sunday. She notes she was lying on couch just prior to onset. Heaviness quality. She notes a little hard to breathe since onset. She notes she has never had chest pain like this before. No radiation of pain. Pain has persistent through night/day without resolution or improvement since onset. Non-positional. Mildly pleuritic. Non-exertional. No recent viral illness. She describes dizziness as vertiginous. Vision is clear today. No slurred speech. She states her R side UE/LE feel heavy and \"funny\" to her. Denies palpitations, dizziness, near-syncope or mason syncope.  Denies paroxysmal nocturnal dyspnea, orthopnea, increasing lower extremity edema or weight gain. Quit smoking 3 weeks ago. 1PPD at that time, 2PPD at peak. 30 year smoking history. She states last methamphetamine use was over 1 year ago. UDS positive. She reiterates no recent use. Past Medical History:   has a past medical history of Depression and Psychiatric problem. Surgical History:   has a past surgical history that includes Tonsillectomy and chest tube insertion (Right). Social History:   reports that she has been smoking cigarettes. She has been smoking an average of 1 pack per day. She has never used smokeless tobacco. She reports current drug use. Drug: Marijuana Coleen Pleas). She reports that she does not drink alcohol. Family History:  family history includes No Known Problems in her mother; Substance Abuse in her father. Reports no history of early onset coronary artery disease, myocardial infarction, cerebrovascular accident or sudden cardiac death among primary relatives. Mother  in [address] along with brother early in life. 2 other brothers - no heart history. Father  of cancer. [employer] boy, autism, no cardiac issues. Home Medications:  Were reviewed and are listed in nursing record and/or below  Prior to Admission medications    Medication Sig Start Date End Date Taking? Authorizing Provider   medical marijuana Take 1 each by mouth as needed.     Historical Provider, MD        CURRENT Medications:  atorvastatin (LIPITOR) tablet 40 mg, Nightly  sodium chloride flush 0.9 % injection 5-40 mL, 2 times per day  sodium chloride flush 0.9 % injection 5-40 mL, PRN  0.9 % sodium chloride infusion, PRN  enoxaparin (LOVENOX) injection 40 mg, Daily  ondansetron (ZOFRAN-ODT) disintegrating tablet 4 mg, Q8H PRN   Or  ondansetron (ZOFRAN) injection 4 mg, Q6H PRN  polyethylene glycol (GLYCOLAX) packet 17 g, Daily PRN  acetaminophen (TYLENOL) tablet 650 mg, Q6H PRN   Or  acetaminophen (TYLENOL) suppository 650 mg, Q6H PRN  aspirin EC tablet 81 mg, Daily      Allergies:  Patient has no known allergies. Review of Systems:   A 14 point review of symptoms completed. Pertinent positives identified in the HPI, all other review of symptoms negative as below. Objective:     Vitals:    11/30/22 0318 11/30/22 0736 11/30/22 1030 11/30/22 1113   BP: (!) 134/92 125/79 (!) 144/90 (!) 151/84   Pulse: 57 63 97 78   Resp: 16 16  16   Temp: 97.5 °F (36.4 °C) 98.1 °F (36.7 °C)  97.4 °F (36.3 °C)   TempSrc: Oral Oral  Oral   SpO2: 98% 99% 96% 95%   Weight:       Height:          Weight: 139 lb 14.4 oz (63.5 kg)       PHYSICAL EXAM:    General:  Young woman in no acute distress    Head:  Normocephalic, atraumatic   Eyes:  Conjunctiva/corneas clear, anicteric sclerae    Nose: Nares normal, no drainage or sinus tenderness   Throat: No abnormalities of the lips, oral mucosa or tongue. Neck: Trachea midline. Neck supple with no lymphadenopathy, thyroid not enlarged, symmetric, no tenderness/mass/nodules   Lungs:   Clear to auscultation bilaterally, no wheezes, no rales, no respiratory distress   Chest Wall:  Chest pain is reproducible on palp chest wall    Heart:  Regular rate and rhythm, normal S1, normal S2, no murmur, no rub, no S3/S4, PMI non-displaced. Abdomen:   Soft, non-tender, with normoactive bowel sounds. No masses, no hepatosplenomegaly   Extremities: No cyanosis, clubbing or pitting edema. Vascular: 2+ radial,  posterior tibial pulses bilaterally. Brisk carotid upstrokes without carotid bruit. Skin: Skin color, texture, turgor are normal with no rashes or ulceration. Pysch: Euthymic mood, inappropriate affect   Neurologic: Oriented to person, place and time. No slurred speech or facial asymmetry.  RUE weakness is noted on strength exam.         Labs:   CBC:   Lab Results   Component Value Date/Time    WBC 10.8 11/29/2022 10:04 AM    RBC 4.62 11/29/2022 10:04 AM    HGB 15.0 11/29/2022 10:04 AM    HCT 43.6 11/29/2022 10:04 AM    MCV 94.3 2022 10:04 AM    RDW 13.4 2022 10:04 AM     2022 10:04 AM     CMP:  Lab Results   Component Value Date/Time     2022 07:34 AM    K 4.3 2022 07:34 AM     2022 07:34 AM    CO2 28 2022 07:34 AM    BUN 11 2022 07:34 AM    CREATININE 0.6 2022 07:34 AM    GFRAA >60 2016 05:54 PM    AGRATIO 2.0 2022 10:04 AM    LABGLOM >60 2022 07:34 AM    GLUCOSE 102 2022 07:34 AM    PROT 8.7 2022 10:04 AM    CALCIUM 9.9 2022 07:34 AM    BILITOT 0.3 2022 10:04 AM    ALKPHOS 85 2022 10:04 AM    AST 17 2022 10:04 AM    ALT 18 2022 10:04 AM     PT/INR:  No results found for: PTINR  HgBA1c:No results found for: LABA1C  Lab Results   Component Value Date    TROPONINI <0.01 2022       No results found for: CHOL  No results found for: TRIG  No results found for: HDL  No results found for: LDLCHOLESTEROL, LDLCALC  No results found for: LABVLDL, VLDL  No results found for: CHOLHDLRATIO     Cardiac Data:     EKG: Personally reviewed with my interpretation as above    Telemetry personally reviewed: no events, NSR 6-70's. Echo: Pending      Impression and Plan:      Chest pain, non-cardiac  -EKG shows mild abnormality, troponins neg   -Will obtain transthoracic echocardiogram for evaluation of underlying cardiac structure and function. Stroke-like symptoms, abnormal exam   Dizziness/vertigo  Weakness RUE  -CT neg thus far; MRI planned  -echo as above     Substance abuse   -Patient was counseled on deleterious effects of illicit drug use and counseled to quit. She denies ongoing use. Tobacco use  -I Strongly advised complete smoking cessation. Resources given to assist quitting including the followin-800-QUIT NOW. Depression       Will follow up echo results. No stress test needed - chest pain is non-cardiac.      Patient Active Problem List   Diagnosis    Stroke-like symptom    Tobacco abuse I will address the patient's cardiac risk factors and adjusted pharmacologic treatment as needed. In addition, I have reinforced the need for patient directed risk factor modification. All questions and concerns were addressed to the patient/family. Alternatives to my treatment were discussed. Thank you for allowing us to participate in the care of Yara Rubio. Please call me with any questions [phone].     Bennie Mcarthur MD, [facility]  Cardiovascular Disease  [facility]  [phone] [facility]  [phone] [address] [place] [address]  11/30/2022 10:41 AM

## 2022-11-30 NOTE — ED NOTES
Report called to [facility] at [facility]. Pt transported on cardiac monitor in stable condition.       Anne Vargas RN  11/29/22 2029

## 2022-11-30 NOTE — PROGRESS NOTES
4 Eyes Admission Assessment     I agree as the admission nurse that 2 RN's have performed a thorough Head to Toe Skin Assessment on the patient. ALL assessment sites listed below have been assessed on admission. Areas assessed by both nurses:   [x]   Head, Face, and Ears   [x]   Shoulders, Back, and Chest  [x]   Arms, Elbows, and Hands   [x]   Coccyx, Sacrum, and Ischium  [x]   Legs, Feet, and Heels        Does the Patient have Skin Breakdown?   No         Kiko Prevention initiated:  NA   Wound Care Orders initiated:  NA      WOC nurse consulted for Pressure Injury (Stage 3,4, Unstageable, DTI, NWPT, and Complex wounds) or Kiko score 18 or lower:  NA      Nurse 1 eSignature: Electronically signed by Patti Arndt RN on 11/29/22 at 11:51 PM EST    **SHARE this note so that the co-signing nurse is able to place an eSignature**    Nurse 2 eSignature: Electronically signed by Marilia Begum RN on 11/30/22 at 12:39 AM EST

## 2022-11-30 NOTE — PLAN OF CARE
Problem: Gastrointestinal - Adult  Goal: Minimal or absence of nausea and vomiting  Outcome: Not Progressing  Note: Pt reported nausea and reduced appetite     Problem: Discharge Planning  Goal: Discharge to home or other facility with appropriate resources  Outcome: Progressing  Flowsheets (Taken 11/30/2022 0226)  Discharge to home or other facility with appropriate resources: Identify barriers to discharge with patient and caregiver     Problem: Pain  Goal: Verbalizes/displays adequate comfort level or baseline comfort level  Outcome: Progressing  Flowsheets (Taken 11/30/2022 0226)  Verbalizes/displays adequate comfort level or baseline comfort level:   Encourage patient to monitor pain and request assistance   Assess pain using appropriate pain scale   Administer analgesics based on type and severity of pain and evaluate response   Implement non-pharmacological measures as appropriate and evaluate response  Note: Patient satisfied with current pain level     Problem: Neurosensory - Adult  Goal: Achieves stable or improved neurological status  Outcome: Progressing  Flowsheets (Taken 11/30/2022 0226)  Achieves stable or improved neurological status: Assess for and report changes in neurological status  Goal: Achieves maximal functionality and self care  Outcome: Progressing     Problem: Skin/Tissue Integrity - Adult  Goal: Skin integrity remains intact  Outcome: Progressing  Flowsheets (Taken 11/30/2022 0226)  Skin Integrity Remains Intact: Monitor for areas of redness and/or skin breakdown     Problem: Musculoskeletal - Adult  Goal: Return mobility to safest level of function  Outcome: Progressing  Flowsheets (Taken 11/30/2022 0226)  Return Mobility to Safest Level of Function: Assess patient stability and activity tolerance for standing, transferring and ambulating with or without assistive devices  Note: Fall precautions in place  Goal: Maintain proper alignment of affected body part  Outcome: Progressing  Goal: Return ADL status to a safe level of function  Outcome: Progressing     Problem: Gastrointestinal - Adult  Goal: Maintains or returns to baseline bowel function  Outcome: Progressing  Goal: Maintains adequate nutritional intake  Outcome: Progressing

## 2022-11-30 NOTE — PROGRESS NOTES
Physical Therapy  Facility/Department: [place] [id number] - MED SURG  Physical Therapy Initial Assessment    Name: Kerri Crump  : 1980  MRN: [id number]  Date of Service: 2022    Discharge Recommendations:  24 hour supervision or assist, Home with Home health PT, Outpatient PT   PT Equipment Recommendations  Equipment Needed: Yes  Mobility Devices: [place]: Rolling      Patient Diagnosis(es): There were no encounter diagnoses. Past Medical History:  has a past medical history of Depression and Psychiatric problem. Past Surgical History:  has a past surgical history that includes Tonsillectomy and chest tube insertion (Right). Assessment   Body Structures, Functions, Activity Limitations Requiring Skilled Therapeutic Intervention: Decreased endurance;Decreased posture;Decreased balance;Decreased safe awareness;Decreased strength;Decreased functional mobility   Assessment: Pt admitted to [address] [place] [address] with diagnosis of stroke like symptoms. PTA pt lives in single story house with SO and has 2 NEYMAR. Pt was independent with all functional mobility and ambulates with no AD. Currently pt presents below baseline function and R LE is globally weaker than L. Pt requires SBA for bed mobility and CGA for transfers. Able to ambualte ~250 ft with RW and CGA and 15ft in room with no AD. Pt. demonstrates improved gait mechanics, safety, and stability with RW. It is rec. pt DC with RW in order to decrease fall risk at home and in the community. Pt is safe to DC home with initial 24/7 supervision and HHPT vs OPPT when deemed medically appropriate. Seen as co-eval with OT in order to safely assess highest level of functional mobility and optimize functional outcomes.   Therapy Prognosis: Good  Decision Making: Medium Complexity  Clinical Presentation: None  Requires PT Follow-Up: Yes  Activity Tolerance  Activity Tolerance: Patient tolerated evaluation without incident     Plan   Physcial Therapy Plan  General Plan: 3-5 times per week  Current Treatment Recommendations: Strengthening, ROM, Balance training, Gait training, Stair training, Functional mobility training, Transfer training, Neuromuscular re-education, Endurance training, Therapeutic activities, Patient/Caregiver education & training, Safety education & training, Home exercise program, Equipment evaluation, education, & procurement  Safety Devices  Type of Devices: Call light within reach, Chair alarm in place, Gait belt, Left in chair, Nurse notified  Restraints  Restraints Initially in Place: No     Restrictions  Restrictions/Precautions  Restrictions/Precautions: Fall Risk, Up as Tolerated  Required Braces or Orthoses?: No  Position Activity Restriction  Other position/activity restrictions: IV     Subjective   Pain: 8/10 pain in R side, reports RN gave pain meds. General  Chart Reviewed: Yes  Patient assessed for rehabilitation services?: Yes  Response To Previous Treatment: Not applicable  Family / Caregiver Present: No  Referring Practitioner: Lucina Paz MD  Referral Date : 11/29/22  Diagnosis: Stroke like symptoms  Follows Commands: Within Functional Limits  General Comment  Comments: Pt in bed upon arriaval, RN cleared for therapy. Subjective  Subjective: pt pleasant and nad agreeable to therapy. Reports 8/10 pain in R abdomen.          Social/Functional History  Social/Functional History  Lives With: Significant other  Type of Home: House  Home Layout: One level  Home Access: Stairs to enter without rails  Entrance Stairs - Number of Steps: 2 NEYMAR  Bathroom Shower/Tub: Tub/Shower unit  Bathroom Toilet: Standard  Home Equipment:  (No DME)  Has the patient had two or more falls in the past year or any fall with injury in the past year?: No  ADL Assistance: [address] [place] [address] Avenue: Independent  Homemaking Responsibilities: Yes  Ambulation Assistance: Independent  Transfer Assistance: Independent  Active : No  Patient's  Info: Friends and family  Occupation: Unemployed  [address]: Clean, dance, go for walks. Vision/Hearing  Vision  Vision: Impaired  Vision Exceptions: Wears glasses at all times  Hearing  Hearing: Within functional limits    Cognition   Orientation  Overall Orientation Status: Within Normal Limits  Orientation Level: Oriented X4     Objective   Heart Rate: 78  Heart Rate Source: Monitor  BP: (!) 151/84  BP Location: Left Arm  BP Method: Automatic  Patient Position: Semi fowlers  MAP (Calculated): 106  Resp: 16  SpO2: 95 %  O2 Device: None (Room air)        Gross Assessment  AROM: Generally decreased, functional  PROM: Within functional limits  Strength: Generally decreased, functional (R LE globally weaker 4- to 4/5. L LE 5/5)     Bed Mobility Training  Bed Mobility Training: Yes  Overall Level of Assistance: Stand-by assistance  Supine to Sit: Stand-by assistance (HOB elevated)  Sit to Supine: Other (comment) (In bed upon arrival)  Balance  Sitting: Intact  Standing: Impaired  Standing - Static: Occasional;Fair  Standing - Dynamic: Occasional  Transfer Training  Transfer Training: Yes  Overall Level of Assistance: Contact-guard assistance;Assist X1  Sit to Stand: Contact-guard assistance  Stand to Sit: Contact-guard assistance  Toilet Transfer: Contact-guard assistance  Gait Training  Gait Training: Yes  Gait  Base of Support: Narrowed; Shift to left  Step Length: Right shortened  Stance: Left increased;Right decreased  Distance (ft): 250 Feet (250+15)  Assistive Device: Walker, rolling;Gait belt (No AD in room)  Rail Use: Right  Stairs - Level of Assistance: Assist X1;Contact-guard assistance (Step to pattern)  Number of Stairs Trained: 2    OutComes Score  AM-PAC Score  AM-PAC Inpatient Mobility Raw Score : 19 (11/30/22 1526)  AM-PAC Inpatient T-Scale Score : 45.44 (11/30/22 1526)  Mobility Inpatient CMS 0-100% Score: 41.77 (11/30/22 1526)  Mobility Inpatient CMS G-Code Modifier : CK (11/30/22 1526)     Goals  Short Term Goals  Time Frame for Short Term Goals: 12/7/22  Short Term Goal 1: pt will complete bed mobility with mod I  Short Term Goal 2: pt will complete transfers with mod I  Short Term Goal 3: pt will ambulate 200 ft with LRAD and mod I  Short Term Goal 4: pt will navigate up<>down 2 steps with supervision  Short Term Goal 5: pt will particiapte in 10-12 reps of BLE exericses by 12/3/22. Patient Goals   Patient Goals : \"To go home\"     Education  Patient Education  Education Given To: Patient  Education Provided: Role of Therapy;Plan of Care;Equipment;Transfer Training  Education Provided Comments: educated on use of RW with ambulation -- continued education needed as this was the first time pt used RW  Education Method: Verbal  Barriers to Learning: None  Education Outcome: Verbalized understanding      Therapy Time   Individual Concurrent Group Co-treatment   Time In 1025         Time Out 1058         Minutes 33         Timed Code Treatment Minutes: 23 Minutes (10 min Eval)       Tomás Marin, PT, DPT    If pt is unable to be seen after this session, please let this note serve as discharge summary. Please see case management note for discharge disposition. Thank you.

## 2022-12-01 VITALS
HEIGHT: 63 IN | BODY MASS INDEX: 24.79 KG/M2 | SYSTOLIC BLOOD PRESSURE: 134 MMHG | TEMPERATURE: 98.2 F | RESPIRATION RATE: 16 BRPM | DIASTOLIC BLOOD PRESSURE: 88 MMHG | OXYGEN SATURATION: 98 % | WEIGHT: 139.9 LBS | HEART RATE: 56 BPM

## 2022-12-01 LAB
ANION GAP SERPL CALCULATED.3IONS-SCNC: 10 MMOL/L (ref 3–16)
BUN BLDV-MCNC: 12 MG/DL (ref 7–20)
CALCIUM SERPL-MCNC: 9.7 MG/DL (ref 8.3–10.6)
CHLORIDE BLD-SCNC: 108 MMOL/L (ref 99–110)
CO2: 27 MMOL/L (ref 21–32)
CREAT SERPL-MCNC: 0.6 MG/DL (ref 0.6–1.1)
GFR SERPL CREATININE-BSD FRML MDRD: >60 ML/MIN/{1.73_M2}
GLUCOSE BLD-MCNC: 103 MG/DL (ref 70–99)
POTASSIUM SERPL-SCNC: 4 MMOL/L (ref 3.5–5.1)
SODIUM BLD-SCNC: 145 MMOL/L (ref 136–145)

## 2022-12-01 PROCEDURE — 93306 TTE W/DOPPLER COMPLETE: CPT

## 2022-12-01 PROCEDURE — 99233 SBSQ HOSP IP/OBS HIGH 50: CPT | Performed by: INTERNAL MEDICINE

## 2022-12-01 PROCEDURE — 2580000003 HC RX 258: Performed by: INTERNAL MEDICINE

## 2022-12-01 PROCEDURE — 97116 GAIT TRAINING THERAPY: CPT

## 2022-12-01 PROCEDURE — 80048 BASIC METABOLIC PNL TOTAL CA: CPT

## 2022-12-01 PROCEDURE — 97110 THERAPEUTIC EXERCISES: CPT

## 2022-12-01 PROCEDURE — 36415 COLL VENOUS BLD VENIPUNCTURE: CPT

## 2022-12-01 PROCEDURE — 6360000002 HC RX W HCPCS: Performed by: INTERNAL MEDICINE

## 2022-12-01 PROCEDURE — 6370000000 HC RX 637 (ALT 250 FOR IP): Performed by: INTERNAL MEDICINE

## 2022-12-01 RX ORDER — ASPIRIN 81 MG/1
81 TABLET ORAL DAILY
Qty: 30 TABLET | Refills: 3 | Status: SHIPPED | OUTPATIENT
Start: 2022-12-02

## 2022-12-01 RX ORDER — ATORVASTATIN CALCIUM 40 MG/1
40 TABLET, FILM COATED ORAL NIGHTLY
Qty: 30 TABLET | Refills: 3 | Status: SHIPPED | OUTPATIENT
Start: 2022-12-01

## 2022-12-01 RX ADMIN — ASPIRIN 81 MG: 81 TABLET, COATED ORAL at 08:48

## 2022-12-01 RX ADMIN — SODIUM CHLORIDE, PRESERVATIVE FREE 10 ML: 5 INJECTION INTRAVENOUS at 08:48

## 2022-12-01 RX ADMIN — ENOXAPARIN SODIUM 40 MG: 100 INJECTION SUBCUTANEOUS at 08:48

## 2022-12-01 ASSESSMENT — PAIN SCALES - GENERAL: PAINLEVEL_OUTOF10: 8

## 2022-12-01 ASSESSMENT — PAIN DESCRIPTION - LOCATION: LOCATION: ABDOMEN

## 2022-12-01 ASSESSMENT — PAIN DESCRIPTION - PAIN TYPE: TYPE: ACUTE PAIN

## 2022-12-01 ASSESSMENT — PAIN DESCRIPTION - FREQUENCY: FREQUENCY: CONTINUOUS

## 2022-12-01 ASSESSMENT — PAIN DESCRIPTION - ONSET: ONSET: ON-GOING

## 2022-12-01 ASSESSMENT — PAIN DESCRIPTION - DESCRIPTORS: DESCRIPTORS: DULL;PRESSURE

## 2022-12-01 NOTE — DISCHARGE SUMMARY
Hospital Medicine Discharge Summary    Patient ID: Hanane Bhatt      Patient's PCP: DIONISIO Porter CNP    Admit Date: 11/29/2022     Discharge Date:   12/1/2022    Admitting Provider: Jena Cheng MD     Discharge Provider: Nic Reynolds MD     Discharge Diagnoses: Active Hospital Problems    Diagnosis     Dizziness [R42]      Priority: Medium    Peripheral vertigo, bilateral [H81.393]      Priority: Medium    Stroke-like symptom [R29.90]      Priority: Medium    Tobacco abuse [Z72.0]      Priority: Medium       The patient was seen and examined on day of discharge and this discharge summary is in conjunction with any daily progress note from day of discharge. Hospital Course:     38 y.o. female who presented to [facility] with no significant PMH presented to [facility] ED earlier today morning with strokelike symptoms. Patient reports last Friday she started to have dizziness and nausea. The symptoms persisted intermittently throughout the weekend. Earlier today she developed heaviness on the right side, reports the right upper and lower extremities feel heavy and slightly weak. She denied any numbness. Denied any facial droop. Denied any speech problems. No symptoms on the left side. She reports she had mild blurring of vision earlier today. She denies any chest pain or shortness of breath, no palpitations, no syncopal episode reported. She has not experienced the symptoms before. She uses medical marijuana and has a card for it. She has prior history of polysubstance abuse but apparently has been clean for the past 3 years. She reports she quit smoking 3 weeks ago. Patient reports her right-sided symptoms still persist during my interview. Denies any history of strokes in the past.     Patient treated for:  Right sided weakness. MRI and echo no acute abnormalities. Continue ASA and statin. Chest pain. Resolved. Cardio following. Echo as above. Studies    Radiology:   MRI BRAIN WO CONTRAST   Final Result   No acute intracranial abnormality. Consults:     IP CONSULT TO NEUROLOGY  IP CONSULT TO FINANCIAL COUNSELOR  IP CONSULT TO CARDIOLOGY    Disposition:  Home with outpt PT/OT     Condition at Discharge: Stable    Discharge Instructions/Follow-up:     Follow up with DIONISIO Lindquist CNP in 2 week(s)  Specialty: Certified Nurse Practitioner [address] Dolores Galindo 1159 [phone]     Return to [facility] with new or worsening symptoms. Code Status:  Full Code     Activity: activity as tolerated    Diet: Regular diet    Discharge Medications:     Current Discharge Medication List             Details   aspirin 81 MG EC tablet Take 1 tablet by mouth daily  Qty: 30 tablet, Refills: 3      atorvastatin (LIPITOR) 40 MG tablet Take 1 tablet by mouth nightly  Qty: 30 tablet, Refills: 3                Details   medical marijuana Take 1 each by mouth as needed. Time Spent on discharge: 35 minutes in the examination, evaluation, counseling and review of medications and discharge plan. This is the daily progress note if patient not discharged today. Signed:    Zuleima Mccarthy MD   12/1/2022      Thank you DIONISIO Eason CNP for the opportunity to be involved in this patient's care. If you have any questions or concerns, please feel free to contact me at [phone].

## 2022-12-01 NOTE — PLAN OF CARE
TODAYS DATE:  12/1/2022    Discussed personal risk factors for Stroke /TIA with patient/family, and ways to reduce the risk for a recurrent stroke. Patient's personal risk factors which were identified are:     [] Alcohol Abuse: check with your physician before any alcohol consumption. [] Atrial fibrillation: may cause blood clots. [] Drug Abuse: Seek help, talk with your doctor  [] Clotting Disorder  [] Diabetes  [] Family history of stroke or heart disease  [] High Blood Pressure/Hypertension: work with your physician.  [] High cholesterol: monitor cholesterol levels with your physician.   [] Overweight/Obesity: work with your physician for your ideal body weight.  [] Physical Inactivity: get regular exercise as directed by your physician. [] Personal history of previous TIA or stroke  [] Poor Diet; decrease salt (sodium) in your diet, follow diet directed by physician. [x] Smoking: Cigarette/Cigar: stop smoking. Reviewed the Following Education with Patient and/or Family:   -Signs and Symptoms of Stroke:     (facial droop, weakness/numbness especially on one side, speech difficulty, sudden confusion, sudden loss of vision, sudden severe headache,       sudden loss of balance or having difficulty walking, syncope or seizure)  -How to activate EMS (911)   -Importance of Follow Up Appointment at Discharge   -Importance of Compliance with Medications Prescribed at Discharge     Pt verbalized understanding.      Family Present during Education: no     Stroke Education Booklet given to patient/family which includes above education: yes     Electronically signed by Kaila Alston RN on 12/1/2022 at 10:02 AM

## 2022-12-01 NOTE — PLAN OF CARE
Problem: Discharge Planning  Goal: Discharge to home or other facility with appropriate resources  Outcome: Progressing   Home at dc  Problem: Neurosensory - Adult  Goal: Achieves stable or improved neurological status  Outcome: Progressing  Goal: Achieves maximal functionality and self care  Outcome: Progressing   Patient has stroke book at bedside.  NIH score 0, mri negative  Problem: Skin/Tissue Integrity - Adult  Goal: Skin integrity remains intact  Outcome: Progressing

## 2022-12-01 NOTE — PROGRESS NOTES
CARDIOLOGY PROGRESS NOTE        Patient Name: Elena Hernandez  Date of admission: 2022  9:00 PM  Admission Dx: Stroke-like symptom [R29.90]  Requesting Physician: Caesar Beltran MD  Primary Care physician: DIONISIO Mccormack Se - CNP    Reason for Consultation/Chief Complaint: Chest pain     History of Present Illness:     Elena Hernandez is a 38 y.o. patient with a prior medical history notable for depression, prior substance abuse, tobacco abuse, who presented to the hospital with complaints of strokelike symptoms. Cardiology is consulted for chest pain and elevated troponin    Patient presented with strokelike symptoms . Noted MRI brain is negative for acute stroke. UDS positive for methamphetamines. She was counseled appropriately yesterday. Echocardiogram shows normal LV function with no atrial level shunt, no other acute concerns. No overnight events. No new concerns this AM. Denies chest pain or pressure. No palpitations. Past Medical History:   has a past medical history of Depression and Psychiatric problem. Surgical History:   has a past surgical history that includes Tonsillectomy and chest tube insertion (Right). Social History:   reports that she has been smoking cigarettes. She has been smoking an average of 1 pack per day. She has never used smokeless tobacco. She reports current drug use. Drug: Marijuana [place]). She reports that she does not drink alcohol. Family History:  family history includes No Known Problems in her mother; Substance Abuse in her father. Reports no history of early onset coronary artery disease, myocardial infarction, cerebrovascular accident or sudden cardiac death among primary relatives. Mother  in [address] along with brother early in life. 2 other brothers - no heart history. Father  of cancer. [employer] boy, autism, no cardiac issues.        Home Medications:  Were reviewed and are listed in nursing record and/or respiratory distress   Chest Wall:  Chest pain is reproducible on palp chest wall    Heart:  Regular rate and rhythm, normal S1, normal S2, no murmur, no rub, no S3/S4, PMI non-displaced. Abdomen:   Soft, non-tender, with normoactive bowel sounds. No masses, no hepatosplenomegaly   Extremities: No cyanosis, clubbing or pitting edema. Vascular: 2+ radial,  posterior tibial pulses bilaterally. Brisk carotid upstrokes without carotid bruit. Skin: Skin color, texture, turgor are normal with no rashes or ulceration. Pysch: Euthymic mood, inappropriate affect   Neurologic: Oriented to person, place and time. No slurred speech or facial asymmetry.         Labs:   CBC:   Lab Results   Component Value Date/Time    WBC 10.8 11/29/2022 10:04 AM    RBC 4.62 11/29/2022 10:04 AM    HGB 15.0 11/29/2022 10:04 AM    HCT 43.6 11/29/2022 10:04 AM    MCV 94.3 11/29/2022 10:04 AM    RDW 13.4 11/29/2022 10:04 AM     11/29/2022 10:04 AM     CMP:  Lab Results   Component Value Date/Time     12/01/2022 06:30 AM    K 4.0 12/01/2022 06:30 AM    K 4.3 11/30/2022 07:34 AM     12/01/2022 06:30 AM    CO2 27 12/01/2022 06:30 AM    BUN 12 12/01/2022 06:30 AM    CREATININE 0.6 12/01/2022 06:30 AM    GFRAA >60 07/30/2016 05:54 PM    AGRATIO 2.0 11/29/2022 10:04 AM    LABGLOM >60 12/01/2022 06:30 AM    GLUCOSE 103 12/01/2022 06:30 AM    PROT 8.7 11/29/2022 10:04 AM    CALCIUM 9.7 12/01/2022 06:30 AM    BILITOT 0.3 11/29/2022 10:04 AM    ALKPHOS 85 11/29/2022 10:04 AM    AST 17 11/29/2022 10:04 AM    ALT 18 11/29/2022 10:04 AM     PT/INR:  No results found for: PTINR  HgBA1c:  Lab Results   Component Value Date    LABA1C 5.5 11/30/2022     Lab Results   Component Value Date    TROPONINI <0.01 11/30/2022       Lab Results   Component Value Date    CHOL 212 (H) 11/30/2022     Lab Results   Component Value Date    TRIG 90 11/30/2022     Lab Results   Component Value Date    HDL 58 11/30/2022     Lab Results   Component Value Date    LDLCALC 136 (H) 2022     Lab Results   Component Value Date    LABVLDL 18 2022     No results found for: [facility]     Cardiac Data:     EKG: Personally reviewed with my interpretation as above    Telemetry personally reviewed: no events, NSR 60-70's. Echo: 22   Summary   Normal left ventricular systolic function with ejection fraction of 55-60%. No regional wall motion abnormalites are seen. Normal left ventricular size with mild concentric left ventricular   hypertrophy. Left ventricular diastolic filling pressure is normal.   A bubble study was performed and fails to show evidence of shunting. No tricuspid regurgitation to estimate systolic pulmonary artery pressure   (SPAP). Impression and Plan:      Chest pain, non-cardiac  -EKG shows mild abnormality, troponins neg   -echo normal    Stroke-like symptoms, abnormal exam   Dizziness/vertigo  Weakness RUE  -MRI neg  -echo as above     Substance abuse   -Patient was counseled on deleterious effects of illicit drug use and counseled to quit. She denies ongoing use. Tobacco use  -I Strongly advised complete smoking cessation. Resources given to assist quitting including the followin-800-QUIT NOW. Depression       Will sign off. Follow up with PCP. Patient Active Problem List   Diagnosis    Stroke-like symptom    Tobacco abuse    Dizziness    Peripheral vertigo, bilateral         I will address the patient's cardiac risk factors and adjusted pharmacologic treatment as needed. In addition, I have reinforced the need for patient directed risk factor modification. All questions and concerns were addressed to the patient/family. Alternatives to my treatment were discussed. Thank you for allowing us to participate in the care of Mary Rodrigez. Please call me with any questions [phone].     Tanner Nesbitt MD, [facility]  Cardiovascular [address] [facility]  [phone] Fay Barone Office  [phone] [facility]  12/1/2022 3:18 PM

## 2022-12-01 NOTE — PROGRESS NOTES
Physical Therapy  Facility/Department: [facility] B3 - MED SURG  Daily Treatment Note  NAME: Renetta Agosto  : 1980  MRN: [id number]    Date of Service: 2022    Discharge Recommendations:  24 hour supervision or assist, Home with Home health PT, Outpatient PT   PT Equipment Recommendations  Equipment Needed: Yes  Walker: Rolling    [facility] 6 Clicks Inpatient Mobility:  AM-PAC Mobility Inpatient   How much difficulty turning over in bed?: None  How much difficulty sitting down on / standing up from a chair with arms?: None  How much difficulty moving from lying on back to sitting on side of bed?: None  How much help from another person moving to and from a bed to a chair?: None  How much help from another person needed to walk in hospital room?: A Little  How much help from another person for climbing 3-5 steps with a railing?: A Little  AM-[facility] Inpatient Mobility Raw Score : 22  AM-PAC Inpatient T-Scale Score : 53.28  Mobility Inpatient CMS 0-100% Score: 20.91  Mobility Inpatient CMS G-Code Modifier : CJ   Patient Diagnosis(es): There were no encounter diagnoses. Assessment   Assessment: Pt tolerated session well progressing to supervision for bed mobility and transfers. Pt completed 350ft AMB without AD and 5 steps with no rail with SBA. Pt does continues to display mild gait deviations such as decreased step length and height with RLE noting her right side feels heavy. Continues to recommend HHPT upon DC and progress to OP PT to maximize functional mobility and independence. Activity Tolerance: Patient tolerated treatment well  Equipment Needed: Yes     Plan    Physcial Therapy Plan  General Plan: 3-5 times per week  Current Treatment Recommendations: Strengthening;ROM;Balance training;Gait training;Stair training;Functional mobility training;Transfer training;Neuromuscular re-education; Endurance training; Therapeutic activities; Patient/Caregiver education & training; Safety education & training;Home exercise program;Equipment evaluation, education, & procurement     Restrictions  Restrictions/Precautions  Restrictions/Precautions: Fall Risk, Up as Tolerated  Required Braces or Orthoses?: No  Position Activity Restriction  Other position/activity restrictions: IV     Subjective    Subjective  Subjective: Pt supine n bed at start of session, agreeable to therapy  Pain: 8/10 pain in R side, reports its a dull pain and tolerable at the moment. Orientation  Overall Orientation Status: Within Normal Limits  Orientation Level: Oriented X4  Cognition  Overall Cognitive Status: WFL     Objective   Vitals  Heart Rate: 76  BP: (!) 135/91  BP Location: Left Arm  MAP (Calculated): 106  SpO2: 97 %  O2 Device: None (Room air)  Bed Mobility Training  Bed Mobility Training: Yes  Overall Level of Assistance: Supervision  Supine to Sit: Supervision  Sit to Supine: Supervision  Balance  Sitting: Intact  Standing: Impaired  Standing - Static: Good  Standing - Dynamic: Fair  Transfer Training  Transfer Training: Yes  Overall Level of Assistance: Supervision;Assist X1  Sit to Stand: Supervision  Stand to Sit: Supervision  Bed to Chair: Supervision  Gait Training  Gait Training: Yes  Gait  Overall Level of Assistance: Stand-by assistance;Assist X1  Interventions: Verbal cues; Safety awareness training  Base of Support: Narrowed; Shift to left  Step Length: Right shortened  Stance: Left increased;Right decreased  Gait Abnormalities: Trunk sway increased  Distance (ft): 350 Feet  Assistive Device: Gait belt  Rail Use: None  Stairs - Level of Assistance: Stand-by assistance  Number of Stairs Trained: 5 (2x1, 3x1)     PT Exercises  Exercise Treatment: Standing Marches, sit to stands 1x10 each  Static Standing Balance Exercises: Narrow stance with eyes closed, semi tandem stance eyes open, tandem stance eyes open 30 seconds each     Safety Devices  Type of Devices: Call light within reach;Gait belt;Left in chair;Nurse notified Goals  Short Term Goals  Time Frame for Short Term Goals: 12/7/22 - All goals progressing   Short Term Goal 1: pt will complete bed mobility with mod I  Short Term Goal 2: pt will complete transfers with mod I  Short Term Goal 3: pt will ambulate 200 ft with LRAD and mod I  Short Term Goal 4: pt will navigate up<>down 2 steps with supervision  Short Term Goal 5: pt will particiapte in 10-12 reps of [employer] exericses by 12/3/22. - goal Met 12/1  Patient Goals   Patient Goals : \"To go home\"    Education  Patient Education  Education Given To: Patient  Education Provided: Role of Therapy;Plan of Care;Equipment;Transfer Training  Education Method: Verbal  Barriers to Learning: None  Education Outcome: Verbalized understanding    Therapy Time   Individual Concurrent Group Co-treatment   Time In 0850         Time Out 0914         Minutes 24         Timed Code Treatment Minutes: 24 Minutes     If pt is unable to be seen after this session, please let this note serve as discharge summary. Please see case management note for discharge disposition. Thank you.     Marilyn Mckay, PT

## 2022-12-01 NOTE — CARE COORDINATION
Chart reviewed. Stroke like symptoms. Management per neuro, cardiology and IM. MRI negative for CVA. Still need ECHO results. Pt likely will discharge home today. Follow up with outpatient PT. CM will continue to follow, should needs arise.  Vy Soto RN

## 2022-12-01 NOTE — PLAN OF CARE
Problem: Discharge Planning  Goal: Discharge to home or other facility with appropriate resources  12/1/2022 1558 by Robert Damon RN  Outcome: Completed  12/1/2022 1002 by Robert Damon RN  Outcome: Progressing     Problem: Pain  Goal: Verbalizes/displays adequate comfort level or baseline comfort level  12/1/2022 1558 by Robert Damon RN  Outcome: Completed  12/1/2022 1002 by Robert Damon RN  Outcome: Progressing     Problem: Neurosensory - Adult  Goal: Achieves stable or improved neurological status  12/1/2022 1558 by Robert Damon RN  Outcome: Completed  12/1/2022 1002 by Robert Damon RN  Outcome: Progressing  Goal: Achieves maximal functionality and self care  12/1/2022 1558 by Robert Damon RN  Outcome: Completed  12/1/2022 1002 by Robert Damon RN  Outcome: Progressing     Problem: Skin/Tissue Integrity - Adult  Goal: Skin integrity remains intact  12/1/2022 1558 by Robert Damon RN  Outcome: Completed  12/1/2022 1002 by Robert Damon RN  Outcome: Progressing     Problem: Musculoskeletal - Adult  Goal: Return mobility to safest level of function  12/1/2022 1558 by Robert Damon RN  Outcome: Completed  12/1/2022 1002 by Robert Damon RN  Outcome: Progressing  Goal: Maintain proper alignment of affected body part  12/1/2022 1558 by Robert Damon RN  Outcome: Completed  12/1/2022 1002 by Robert Damon RN  Outcome: Progressing  Goal: Return ADL status to a safe level of function  12/1/2022 1558 by Robert Damon RN  Outcome: Completed  12/1/2022 1002 by Robert Damon RN  Outcome: Progressing     Problem: Gastrointestinal - Adult  Goal: Minimal or absence of nausea and vomiting  12/1/2022 1558 by Robert Damon RN  Outcome: Completed  12/1/2022 1002 by Robert Damon RN  Outcome: Progressing  Goal: Maintains or returns to baseline bowel function  12/1/2022 1558 by Robert Damon RN  Outcome: Completed  12/1/2022 1002 by Anabel Mathis, RN  Outcome: Progressing  Goal: Maintains adequate nutritional intake  12/1/2022 1558 by Anabel Mathis, RN  Outcome: Completed  12/1/2022 1002 by Anabel Mathis RN  Outcome: Progressing

## 2022-12-02 NOTE — PROGRESS NOTES
Physician Progress Note      PATIENT:               Tom Greenberg  CSN #:                  [id number]  :                       1980  ADMIT DATE:       2022 9:00 PM  [address] [place] DATE:        2022 5:23 PM  RESPONDING  PROVIDER #:        Lisette Dandy MD          QUERY TEXT:    Patient admitted with right sided weakness, ataxia, and dizziness. Noted   documentation of \"likely peripheral vertigo or benign paroxysmal vertigo\"   neurology . If possible, please document in progress notes and discharge summary if you   are evaluating and /or treating any of the following:[[New onset dizziness and   ataxia, improving, likely peripheral vertigo or benign paroxysmal vertigo   confirmed::After study, New onset dizziness and ataxia, improving, likely   peripheral vertigo or benign paroxysmal vertigo confirmed.]      The medical record reflects the following:  Risk Factors: Age, visual disturbances, weakness  Clinical Indicators: \"New onset dizziness and ataxia, improving, likely   peripheral vertigo or benign paroxysmal vertigo\"- Neurology , MRI   negative  Treatment: Neurology consult, MRI, serial labs, and supportive care    Thank you,  Jayce Lim, RN, BSN  Options provided:  -- Respond - Create new note now  -- Disagree - Not applicable / Not valid  -- Disagree - Clinically unable to determine / Unknown  -- Refer to Clinical Documentation Reviewer    PROVIDER RESPONSE TEXT:    Probable benign paroxysmal vertigo after study.     Query created by: Amaury Ramirez on 2022 1:09 PM      Electronically signed by:  Lisette Dandy MD 2022 3:21 PM

## 2024-11-04 ENCOUNTER — HOSPITAL ENCOUNTER (EMERGENCY)
Age: 44
Discharge: HOME | End: 2024-11-04
Payer: COMMERCIAL

## 2024-11-04 ENCOUNTER — APPOINTMENT (OUTPATIENT)
Dept: GENERAL RADIOLOGY | Age: 44
End: 2024-11-04
Payer: COMMERCIAL

## 2024-11-04 VITALS
WEIGHT: 140 LBS | HEIGHT: 63 IN | DIASTOLIC BLOOD PRESSURE: 90 MMHG | OXYGEN SATURATION: 97 % | SYSTOLIC BLOOD PRESSURE: 156 MMHG | TEMPERATURE: 98.1 F | RESPIRATION RATE: 18 BRPM | BODY MASS INDEX: 24.8 KG/M2 | HEART RATE: 96 BPM

## 2024-11-04 DIAGNOSIS — S93.601A RIGHT FOOT SPRAIN, INITIAL ENCOUNTER: ICD-10-CM

## 2024-11-04 DIAGNOSIS — S93.401A MODERATE RIGHT ANKLE SPRAIN, INITIAL ENCOUNTER: Primary | ICD-10-CM

## 2024-11-04 PROCEDURE — 99283 EMERGENCY DEPT VISIT LOW MDM: CPT

## 2024-11-04 PROCEDURE — 73630 X-RAY EXAM OF FOOT: CPT

## 2024-11-04 PROCEDURE — 73610 X-RAY EXAM OF ANKLE: CPT

## 2024-11-04 RX ORDER — AMLODIPINE BESYLATE 5 MG/1
7.5 TABLET ORAL DAILY
COMMUNITY

## 2024-11-04 RX ORDER — MELOXICAM 7.5 MG/1
7.5 TABLET ORAL DAILY
COMMUNITY

## 2024-11-04 RX ORDER — TRAZODONE HYDROCHLORIDE 50 MG/1
50 TABLET, FILM COATED ORAL NIGHTLY
COMMUNITY

## 2024-11-04 RX ORDER — ROSUVASTATIN CALCIUM 5 MG/1
5 TABLET, COATED ORAL DAILY
COMMUNITY

## 2024-11-04 ASSESSMENT — PAIN SCALES - GENERAL: PAINLEVEL_OUTOF10: 7

## 2024-11-04 ASSESSMENT — PAIN - FUNCTIONAL ASSESSMENT: PAIN_FUNCTIONAL_ASSESSMENT: 0-10

## 2024-11-04 NOTE — ED PROVIDER NOTES
if symptoms not improving.  Discharged in stable condition with family.      I estimate there is LOW risk for COMPARTMENT SYNDROME, TENDON OR NEUROVASCULAR INJURY, thus I consider the discharge disposition reasonable.      I am the Primary Clinician of Record.  FINAL IMPRESSION      1. Moderate right ankle sprain, initial encounter    2. Right foot sprain, initial encounter          DISPOSITION/PLAN     DISPOSITION Decision to Discharge    PATIENT REFERRED TO:  Christoph Lala MD  [address] [facility] [address]  [phone]      [facility] orthopedics as needed      DISCHARGE MEDICATIONS:  Discharge Medication List as of 11/4/2024  3:34 PM          DISCONTINUED MEDICATIONS:  Discharge Medication List as of 11/4/2024  3:34 PM        STOP taking these medications       aspirin 81 MG EC tablet Comments:   Reason for Stopping:         atorvastatin (LIPITOR) 40 MG tablet Comments:   Reason for Stopping:                      (Please note that portions of this note were completed with a voice recognition program.  Efforts were made to edit the dictations but occasionally words are mis-transcribed.)    Leslie Aveyr PA-C (electronically signed)           Leslie Avery PA-C  11/04/24 0623

## 2024-11-04 NOTE — DISCHARGE INSTRUCTIONS
Right foot and ankle x-rays are negative for fracture.  Suspect foot and ankle sprain.  Recommend rest.  Ice.  Elevate.  Continue current medications.  Referral to orthopedics provided for further evaluation if symptoms not resolving.

## 2024-11-07 ENCOUNTER — TELEPHONE (OUTPATIENT)
Dept: ORTHOPEDIC SURGERY | Age: 44
End: 2024-11-07

## 2024-11-07 NOTE — TELEPHONE ENCOUNTER
Emergency department follow up  Right ankle sprain/Right foot sprain  11/4/2024      Lmom pt should follow up with Dr Wilson foot and ankle specialist. shelley